# Patient Record
Sex: MALE
[De-identification: names, ages, dates, MRNs, and addresses within clinical notes are randomized per-mention and may not be internally consistent; named-entity substitution may affect disease eponyms.]

---

## 2018-07-23 PROBLEM — Z00.00 ENCOUNTER FOR PREVENTIVE HEALTH EXAMINATION: Status: ACTIVE | Noted: 2018-07-23

## 2018-08-02 ENCOUNTER — RECORD ABSTRACTING (OUTPATIENT)
Age: 48
End: 2018-08-02

## 2018-08-02 DIAGNOSIS — Z86.79 PERSONAL HISTORY OF OTHER DISEASES OF THE CIRCULATORY SYSTEM: ICD-10-CM

## 2018-08-02 DIAGNOSIS — Z80.3 FAMILY HISTORY OF MALIGNANT NEOPLASM OF BREAST: ICD-10-CM

## 2018-08-02 DIAGNOSIS — Z78.9 OTHER SPECIFIED HEALTH STATUS: ICD-10-CM

## 2018-08-02 DIAGNOSIS — Z80.1 FAMILY HISTORY OF MALIGNANT NEOPLASM OF TRACHEA, BRONCHUS AND LUNG: ICD-10-CM

## 2018-08-02 DIAGNOSIS — Z83.3 FAMILY HISTORY OF DIABETES MELLITUS: ICD-10-CM

## 2018-08-02 LAB
HBA1C MFR BLD: 13.3
HBA1C MFR BLD: 13.3
PODIATRY EVAL: NORMAL

## 2018-08-02 RX ORDER — OMEPRAZOLE 20 MG/1
20 TABLET, DELAYED RELEASE ORAL DAILY
Refills: 0 | Status: ACTIVE | COMMUNITY

## 2018-08-02 RX ORDER — PEN NEEDLE, DIABETIC 31 GX5/16"
NEEDLE, DISPOSABLE MISCELLANEOUS
Refills: 0 | Status: ACTIVE | COMMUNITY

## 2018-08-02 RX ORDER — ATORVASTATIN CALCIUM 10 MG/1
10 TABLET, FILM COATED ORAL DAILY
Refills: 0 | Status: ACTIVE | COMMUNITY

## 2018-08-22 ENCOUNTER — APPOINTMENT (OUTPATIENT)
Dept: ENDOCRINOLOGY | Facility: CLINIC | Age: 48
End: 2018-08-22

## 2018-08-27 ENCOUNTER — MEDICATION RENEWAL (OUTPATIENT)
Age: 48
End: 2018-08-27

## 2018-09-14 ENCOUNTER — APPOINTMENT (OUTPATIENT)
Dept: ENDOCRINOLOGY | Facility: CLINIC | Age: 48
End: 2018-09-14
Payer: COMMERCIAL

## 2018-09-14 VITALS
WEIGHT: 256 LBS | DIASTOLIC BLOOD PRESSURE: 80 MMHG | SYSTOLIC BLOOD PRESSURE: 132 MMHG | HEIGHT: 72 IN | OXYGEN SATURATION: 97 % | HEART RATE: 87 BPM | BODY MASS INDEX: 34.67 KG/M2

## 2018-09-14 LAB — GLUCOSE BLDC GLUCOMTR-MCNC: 104

## 2018-09-14 PROCEDURE — 82962 GLUCOSE BLOOD TEST: CPT

## 2018-09-14 PROCEDURE — 99214 OFFICE O/P EST MOD 30 MIN: CPT | Mod: 25

## 2018-09-21 ENCOUNTER — APPOINTMENT (OUTPATIENT)
Dept: ENDOCRINOLOGY | Facility: CLINIC | Age: 48
End: 2018-09-21

## 2018-10-23 ENCOUNTER — APPOINTMENT (OUTPATIENT)
Dept: ENDOCRINOLOGY | Facility: CLINIC | Age: 48
End: 2018-10-23

## 2018-11-07 ENCOUNTER — APPOINTMENT (OUTPATIENT)
Dept: ENDOCRINOLOGY | Facility: CLINIC | Age: 48
End: 2018-11-07

## 2018-11-16 ENCOUNTER — MEDICATION RENEWAL (OUTPATIENT)
Age: 48
End: 2018-11-16

## 2018-12-14 ENCOUNTER — MEDICATION RENEWAL (OUTPATIENT)
Age: 48
End: 2018-12-14

## 2018-12-20 ENCOUNTER — MEDICATION RENEWAL (OUTPATIENT)
Age: 48
End: 2018-12-20

## 2019-02-11 ENCOUNTER — RX RENEWAL (OUTPATIENT)
Age: 49
End: 2019-02-11

## 2019-02-12 ENCOUNTER — RX CHANGE (OUTPATIENT)
Age: 49
End: 2019-02-12

## 2019-02-12 RX ORDER — INSULIN LISPRO 100 [IU]/ML
100 INJECTION, SOLUTION INTRAVENOUS; SUBCUTANEOUS
Refills: 0 | Status: DISCONTINUED | COMMUNITY
End: 2019-02-12

## 2019-03-11 ENCOUNTER — RX RENEWAL (OUTPATIENT)
Age: 49
End: 2019-03-11

## 2019-03-13 ENCOUNTER — MEDICATION RENEWAL (OUTPATIENT)
Age: 49
End: 2019-03-13

## 2019-03-18 ENCOUNTER — MOBILE ON CALL (OUTPATIENT)
Age: 49
End: 2019-03-18

## 2019-03-19 ENCOUNTER — RX RENEWAL (OUTPATIENT)
Age: 49
End: 2019-03-19

## 2019-03-20 ENCOUNTER — RECORD ABSTRACTING (OUTPATIENT)
Age: 49
End: 2019-03-20

## 2019-03-20 ENCOUNTER — MEDICATION RENEWAL (OUTPATIENT)
Age: 49
End: 2019-03-20

## 2019-03-21 ENCOUNTER — APPOINTMENT (OUTPATIENT)
Dept: ENDOCRINOLOGY | Facility: CLINIC | Age: 49
End: 2019-03-21
Payer: MEDICAID

## 2019-03-21 ENCOUNTER — APPOINTMENT (OUTPATIENT)
Dept: ENDOCRINOLOGY | Facility: CLINIC | Age: 49
End: 2019-03-21

## 2019-03-21 VITALS
HEIGHT: 72 IN | OXYGEN SATURATION: 97 % | WEIGHT: 259 LBS | DIASTOLIC BLOOD PRESSURE: 70 MMHG | SYSTOLIC BLOOD PRESSURE: 112 MMHG | HEART RATE: 97 BPM | BODY MASS INDEX: 35.08 KG/M2

## 2019-03-21 LAB — GLUCOSE BLDC GLUCOMTR-MCNC: 128

## 2019-03-21 PROCEDURE — 99214 OFFICE O/P EST MOD 30 MIN: CPT | Mod: 25

## 2019-03-21 PROCEDURE — 82962 GLUCOSE BLOOD TEST: CPT

## 2019-03-21 RX ORDER — METFORMIN HYDROCHLORIDE 500 MG/1
500 TABLET, FILM COATED, EXTENDED RELEASE ORAL
Refills: 0 | Status: DISCONTINUED | COMMUNITY
End: 2019-03-21

## 2019-03-21 RX ORDER — INSULIN LISPRO 100 U/ML
100 INJECTION, SOLUTION INTRAVENOUS; SUBCUTANEOUS
Qty: 2 | Refills: 1 | Status: DISCONTINUED | COMMUNITY
Start: 2019-02-12 | End: 2019-03-21

## 2019-03-21 RX ADMIN — Medication 0: at 00:00

## 2019-03-21 NOTE — ASSESSMENT
[FreeTextEntry1] : 49 y/o obese male with Type 2 DM, Hyperlipidemia, HTN, and Vitamin D Deficiency. No recent labs. \par \par Plan: \par Type 2 DM: zac professional placed\par - labs now\par - restart Basaglar 40 units in pm\par - continue current medication regimen:\par Metformin 2000 mg daily\par Novolog 10-20 units sliding scale with meals\par - continue self BS monitoring\par - bring meter and logs to next visit\par \par Obesity: educated on healthy food choices\par - encouraged to increase routine exercise\par - discussed Bariatric surgery and handout given -will review information \par \par Hyperlipidemia: labs now, continue Atorvastatin\par \par HTN: stable. continue current medication regimen\par \par Vitamin D Deficiency: labs now, continue vitamin D supplement 50,000 units weekly\par \par Follow up visit in 2 weeks.

## 2019-03-21 NOTE — HISTORY OF PRESENT ILLNESS
[FreeTextEntry1] : Quality: Type 2 DM\par Severity: moderate \par Duration: 20 years\par Onset: fatigue\par Modifying Factors: Better with insulin \par Associated Symptoms: \par \par Current Regimen:\par Basaglar 40 units in pm- has not taken medication for months, will start today\par Metformin 2000 mg daily\par Novolog 10-20 units before meals - adjusts dose based on carbs \par \par Self blood sugar monitoring: 3-4 times a day, no meter, no logs\par per pt. averaging 200 - 280 \par \par exercise: active at work\par \par Diet:\par B- coffee, donut, sausage egg and cheese \par L- hamburgers, pizza, chicken cutlets. fruits and vegetables \par D- chicken, vegetables, rice\par Snacks- none\par \par Date of last eye exam: 1-2 years ago\par Date of last foot exam: 2018\par Date of last flu vaccine: 2019\par Date of last Pneumovax: no

## 2019-03-21 NOTE — REVIEW OF SYSTEMS
[Headache] : headaches [Fatigue] : no fatigue [Decreased Appetite] : appetite not decreased [Recent Weight Gain (___ Lbs)] : no recent weight gain [Recent Weight Loss (___ Lbs)] : no recent weight loss [Visual Field Defect] : no visual field defect [Blurry Vision] : no blurred vision [Dysphagia] : no dysphagia [Dysphonia] : no dysphonia [Neck Pain] : no neck pain [Chest Pain] : no chest pain [Palpitations] : no palpitations [SOB on Exertion] : no shortness of breath during exertion [Constipation] : no constipation [Diarrhea] : no diarrhea [Polyuria] : no polyuria [Dysuria] : no dysuria [Tremors] : no tremors [Depression] : no depression [Anxiety] : no anxiety [Polydipsia] : no polydipsia [Cold Intolerance] : cold tolerant [Heat Intolerance] : heat tolerant [Easy Bruising] : no tendency for easy bruising [Swelling] : no swelling [FreeTextEntry2] : weight stable  [de-identified] : often

## 2019-03-21 NOTE — REASON FOR VISIT
[Follow-Up: _____] : a [unfilled] follow-up visit [FreeTextEntry1] : Type 2 DM, Hyperlipidemia, HTN, and Vitamin D Deficiency

## 2019-03-21 NOTE — PHYSICAL EXAM
[Alert] : alert [No Acute Distress] : no acute distress [Well Nourished] : well nourished [Well Developed] : well developed [Normal Sclera/Conjunctiva] : normal sclera/conjunctiva [EOMI] : extra ocular movement intact [Normal Hearing] : hearing was normal [Supple] : the neck was supple [No LAD] : no lymphadenopathy [Thyroid Not Enlarged] : the thyroid was not enlarged [No Thyroid Nodules] : there were no palpable thyroid nodules [Normal Rate and Effort] : normal respiratory rhythm and effort [No Accessory Muscle Use] : no accessory muscle use [Clear to Auscultation] : lungs were clear to auscultation bilaterally [Normal Rate] : heart rate was normal  [Normal S1, S2] : normal S1 and S2 [Regular Rhythm] : with a regular rhythm [Normal Bowel Sounds] : normal bowel sounds [Not Tender] : non-tender [Soft] : abdomen soft [Normal Gait] : normal gait [Acanthosis Nigricans] : no acanthosis nigricans [Right Foot Was Examined] : right foot ~C was examined [Left Foot Was Examined] : left foot ~C was examined [Normal] : normal [Full ROM] : with full range of motion [Diminished Throughout Both Feet] : normal tactile sensation with monofilament testing throughout both feet [No Motor Deficits] : the motor exam was normal [No Tremors] : no tremors [Oriented x3] : oriented to person, place, and time [Normal Insight/Judgement] : insight and judgment were intact [Normal Mood] : the mood was normal

## 2019-04-03 ENCOUNTER — MEDICATION RENEWAL (OUTPATIENT)
Age: 49
End: 2019-04-03

## 2019-04-03 RX ORDER — SEMAGLUTIDE 1.34 MG/ML
2 INJECTION, SOLUTION SUBCUTANEOUS
Qty: 3 | Refills: 0 | Status: DISCONTINUED | COMMUNITY
Start: 2018-09-14 | End: 2019-04-03

## 2019-04-04 ENCOUNTER — APPOINTMENT (OUTPATIENT)
Dept: ENDOCRINOLOGY | Facility: CLINIC | Age: 49
End: 2019-04-04
Payer: MEDICAID

## 2019-04-04 ENCOUNTER — APPOINTMENT (OUTPATIENT)
Dept: ENDOCRINOLOGY | Facility: CLINIC | Age: 49
End: 2019-04-04

## 2019-04-04 VITALS
DIASTOLIC BLOOD PRESSURE: 70 MMHG | SYSTOLIC BLOOD PRESSURE: 110 MMHG | HEART RATE: 93 BPM | WEIGHT: 257 LBS | BODY MASS INDEX: 34.81 KG/M2 | HEIGHT: 72 IN | OXYGEN SATURATION: 97 %

## 2019-04-04 LAB — GLUCOSE BLDC GLUCOMTR-MCNC: 112

## 2019-04-04 PROCEDURE — 99214 OFFICE O/P EST MOD 30 MIN: CPT | Mod: 25

## 2019-04-04 PROCEDURE — 95251 CONT GLUC MNTR ANALYSIS I&R: CPT

## 2019-04-04 NOTE — HISTORY OF PRESENT ILLNESS
[FreeTextEntry1] : Patient presents today for routine follow-up of type 2 diabetes with associated neuropathy, hypertension, and hyperlipidemia. \par \par Current meds for glycemic control:\par Metformin 500 mg, 4 tabs daily\par Basaglar 40 units daily- has not been taking consistently, often falls asleep before he remembers to take it.\par Novolog 25 units with meals if eating bread, 10-15 units if eating salad or healthier meals\par Ozempic 0.25 mg weekly on Monday. Did not take this past Monday but had not missed any doses otherwise.\par SMBG 4x daily. Reviewed logs. BG has been primarily 170s to 210s, with a low of 126 and high of 320. \par Reviewed CGMS. Avg  +/- 54.1 with 63% of values in range, 36% high, and 1% low. He has a short post prandial spike in BG after breakfast, followed by a drop in BG In the early afternoon.\par Current B, ate eggs and coffee with creamer 8 hours ago.

## 2019-04-04 NOTE — PHYSICAL EXAM
[Alert] : alert [No Acute Distress] : no acute distress [Well Nourished] : well nourished [Well Developed] : well developed [Normal Sclera/Conjunctiva] : normal sclera/conjunctiva [EOMI] : extra ocular movement intact [No Proptosis] : no proptosis [Normal Oropharynx] : the oropharynx was normal [Thyroid Not Enlarged] : the thyroid was not enlarged [No Thyroid Nodules] : there were no palpable thyroid nodules [No Respiratory Distress] : no respiratory distress [No Accessory Muscle Use] : no accessory muscle use [Clear to Auscultation] : lungs were clear to auscultation bilaterally [Normal Rate] : heart rate was normal  [Normal S1, S2] : normal S1 and S2 [Regular Rhythm] : with a regular rhythm [No Edema] : there was no peripheral edema [Normal Gait] : normal gait [Oriented x3] : oriented to person, place, and time [Acanthosis Nigricans] : no acanthosis nigricans

## 2019-04-04 NOTE — ASSESSMENT
[FreeTextEntry1] : 48 year old male with T2DM and associated neuropathy, also with hypertension. Glycemic control is improving based on CGMS data.\par \par 1. T2DM\par -Increase Ozempic to 0.5 mg weekly. \par -Decrease Novolog to 20 units at breakfast. Continue 25 units before dinner. Usually only has two meals daily.\par -Continue to hold basal insulin- would likely benefit from basal insulin in the future, but will not add at this time given hypoglycemia on CGMS.\par -After he finishes current supply of Ozempic, will switch to Trulicity 1.5 mg weekly for insurance purposes.\par -Continue SMBG 4x daily and record readings with diet and insulin doses on log sheets.\par -Watch diet! Be careful of carbohydrate intake.\par -RTO to review data and for insulin adjustment in one month.\par \par 2. Hypertension- controlled.\par -Continue ARB.

## 2019-04-08 ENCOUNTER — APPOINTMENT (OUTPATIENT)
Age: 49
End: 2019-04-08
Payer: MEDICAID

## 2019-04-08 VITALS
DIASTOLIC BLOOD PRESSURE: 90 MMHG | OXYGEN SATURATION: 97 % | HEART RATE: 94 BPM | WEIGHT: 260 LBS | SYSTOLIC BLOOD PRESSURE: 145 MMHG | BODY MASS INDEX: 35.21 KG/M2 | HEIGHT: 72 IN | TEMPERATURE: 98.4 F

## 2019-04-08 DIAGNOSIS — F17.200 NICOTINE DEPENDENCE, UNSPECIFIED, UNCOMPLICATED: ICD-10-CM

## 2019-04-08 PROCEDURE — 99204 OFFICE O/P NEW MOD 45 MIN: CPT

## 2019-04-08 NOTE — REVIEW OF SYSTEMS
[Heartburn] : heartburn [Poor quality erections] : Poor quality erections [No erections] : no erections [Seen by urologist before (Name)  ___] : Preciously seen by a urologist: [unfilled] [Wake up at night to urinate  How many times?  ___] : wakes up to urinate [unfilled] times during the night [Negative] : Heme/Lymph

## 2019-04-13 LAB
ANION GAP SERPL CALC-SCNC: 16 MMOL/L
APPEARANCE: CLEAR
BACTERIA: NEGATIVE
BASOPHILS # BLD AUTO: 0.04 K/UL
BASOPHILS NFR BLD AUTO: 0.4 %
BILIRUBIN URINE: NEGATIVE
BLOOD URINE: NEGATIVE
BUN SERPL-MCNC: 24 MG/DL
CALCIUM SERPL-MCNC: 10.3 MG/DL
CHLORIDE SERPL-SCNC: 102 MMOL/L
CO2 SERPL-SCNC: 22 MMOL/L
COLOR: YELLOW
CREAT SERPL-MCNC: 1.32 MG/DL
EOSINOPHIL # BLD AUTO: 0.13 K/UL
EOSINOPHIL NFR BLD AUTO: 1.1 %
ESTRADIOL SERPL-MCNC: 18 PG/ML
FSH SERPL-MCNC: 10.5 IU/L
GLUCOSE QUALITATIVE U: NORMAL
GLUCOSE SERPL-MCNC: 181 MG/DL
HCT VFR BLD CALC: 41.5 %
HGB BLD-MCNC: 13.5 G/DL
HYALINE CASTS: 1 /LPF
IMM GRANULOCYTES NFR BLD AUTO: 0.7 %
KETONES URINE: NEGATIVE
LEUKOCYTE ESTERASE URINE: NEGATIVE
LYMPHOCYTES # BLD AUTO: 4.26 K/UL
LYMPHOCYTES NFR BLD AUTO: 37.6 %
MAN DIFF?: NORMAL
MCHC RBC-ENTMCNC: 29 PG
MCHC RBC-ENTMCNC: 32.5 GM/DL
MCV RBC AUTO: 89.1 FL
MICROSCOPIC-UA: NORMAL
MONOCYTES # BLD AUTO: 0.7 K/UL
MONOCYTES NFR BLD AUTO: 6.2 %
NEUTROPHILS # BLD AUTO: 6.11 K/UL
NEUTROPHILS NFR BLD AUTO: 54 %
NITRITE URINE: NEGATIVE
PH URINE: 6
PLATELET # BLD AUTO: 370 K/UL
POTASSIUM SERPL-SCNC: 4.7 MMOL/L
PROTEIN URINE: NORMAL
PSA SERPL-MCNC: 0.16 NG/ML
RBC # BLD: 4.66 M/UL
RBC # FLD: 12.5 %
RED BLOOD CELLS URINE: 1 /HPF
SODIUM SERPL-SCNC: 140 MMOL/L
SPECIFIC GRAVITY URINE: 1.03
SQUAMOUS EPITHELIAL CELLS: 0 /HPF
TESTOST BND SERPL-MCNC: 7.2 PG/ML
TESTOST SERPL-MCNC: 215.5 NG/DL
UROBILINOGEN URINE: NORMAL
WBC # FLD AUTO: 11.32 K/UL
WHITE BLOOD CELLS URINE: 1 /HPF

## 2019-04-13 NOTE — PHYSICAL EXAM
[General Appearance - Well Developed] : well developed [General Appearance - Well Nourished] : well nourished [Normal Appearance] : normal appearance [Well Groomed] : well groomed [General Appearance - In No Acute Distress] : no acute distress [Edema] : no peripheral edema [] : no respiratory distress [Respiration, Rhythm And Depth] : normal respiratory rhythm and effort [Exaggerated Use Of Accessory Muscles For Inspiration] : no accessory muscle use [Abdomen Soft] : soft [Abdomen Tenderness] : non-tender [Costovertebral Angle Tenderness] : no ~M costovertebral angle tenderness [Urethral Meatus] : meatus normal [Penis Abnormality] : normal circumcised penis [Scrotum] : the scrotum was normal [Testes Tenderness] : no tenderness of the testes [Prostate Enlargement] : the prostate was not enlarged [Prostate Tenderness] : the prostate was not tender [No Prostate Nodules] : no prostate nodules [Normal Station and Gait] : the gait and station were normal for the patient's age [No Focal Deficits] : no focal deficits [Sensation] : the sensory exam was normal to light touch and pinprick [Motor Exam] : the motor exam was normal [Oriented To Time, Place, And Person] : oriented to person, place, and time [Affect] : the affect was normal [Not Anxious] : not anxious [No Palpable Adenopathy] : no palpable adenopathy [FreeTextEntry1] : 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

## 2019-04-13 NOTE — ASSESSMENT
[FreeTextEntry1] : Erectile dysfunction:  Will order lab work CBC, BMP, testosterone, PSA, estradiol,FSH and return for duplex US of the penis for evaluation of Erectile Dysfunction

## 2019-04-13 NOTE — HISTORY OF PRESENT ILLNESS
[FreeTextEntry1] : Mr Lee is a 47 yo Male that presents for evaluation of erectile dysfunction.  He is a known diabetic with poor control.  He has prior been seen by another urologist for treatment of Erectile issues but they no longer take his insurance.  He stated he has tried Viagra and Cialis and their generics as well with no result and also tried some injection therapy that he did not know the name of the medication that was also limited in its effectiveness. He states he is unable to obtain an erection but often does ejaculate through a flaccid penis when stimulated.  He denies any dysuria, fever, chills, and flank pain.  he has no other urinary issues.

## 2019-04-15 ENCOUNTER — APPOINTMENT (OUTPATIENT)
Dept: UROLOGY | Facility: CLINIC | Age: 49
End: 2019-04-15
Payer: MEDICAID

## 2019-04-15 VITALS
HEIGHT: 72 IN | OXYGEN SATURATION: 96 % | BODY MASS INDEX: 35.21 KG/M2 | HEART RATE: 102 BPM | DIASTOLIC BLOOD PRESSURE: 53 MMHG | SYSTOLIC BLOOD PRESSURE: 110 MMHG | WEIGHT: 260 LBS

## 2019-04-15 PROCEDURE — 93980 PENILE VASCULAR STUDY: CPT

## 2019-04-15 PROCEDURE — 96374 THER/PROPH/DIAG INJ IV PUSH: CPT | Mod: 59

## 2019-04-15 PROCEDURE — 54235 NJX CORPORA CAVERNOSA RX AGT: CPT

## 2019-04-27 LAB — TESTOST SERPL-MCNC: 941 NG/DL

## 2019-05-07 ENCOUNTER — RX RENEWAL (OUTPATIENT)
Age: 49
End: 2019-05-07

## 2019-05-13 ENCOUNTER — APPOINTMENT (OUTPATIENT)
Dept: UROLOGY | Facility: CLINIC | Age: 49
End: 2019-05-13
Payer: MEDICAID

## 2019-05-13 PROCEDURE — 96372 THER/PROPH/DIAG INJ SC/IM: CPT

## 2019-05-13 RX ADMIN — TESTOSTERONE CYPIONATE 0 MG/ML: 200 INJECTION INTRAMUSCULAR at 00:00

## 2019-05-20 ENCOUNTER — APPOINTMENT (OUTPATIENT)
Dept: UROLOGY | Facility: CLINIC | Age: 49
End: 2019-05-20

## 2019-05-31 ENCOUNTER — APPOINTMENT (OUTPATIENT)
Dept: ENDOCRINOLOGY | Facility: CLINIC | Age: 49
End: 2019-05-31
Payer: MEDICAID

## 2019-05-31 VITALS
HEART RATE: 98 BPM | BODY MASS INDEX: 35.35 KG/M2 | WEIGHT: 261 LBS | SYSTOLIC BLOOD PRESSURE: 146 MMHG | DIASTOLIC BLOOD PRESSURE: 88 MMHG | HEIGHT: 72 IN

## 2019-05-31 LAB — GLUCOSE BLDC GLUCOMTR-MCNC: 222

## 2019-05-31 PROCEDURE — 99214 OFFICE O/P EST MOD 30 MIN: CPT | Mod: 25

## 2019-05-31 PROCEDURE — 82962 GLUCOSE BLOOD TEST: CPT

## 2019-05-31 RX ORDER — DULAGLUTIDE 0.75 MG/.5ML
0.75 INJECTION, SOLUTION SUBCUTANEOUS
Qty: 3 | Refills: 1 | Status: DISCONTINUED | COMMUNITY
Start: 2019-04-03 | End: 2019-05-31

## 2019-05-31 RX ORDER — INSULIN ASPART 100 [IU]/ML
100 INJECTION, SOLUTION INTRAVENOUS; SUBCUTANEOUS
Refills: 0 | Status: DISCONTINUED | COMMUNITY
End: 2019-05-31

## 2019-05-31 RX ORDER — EXENATIDE 2 MG/.85ML
2 INJECTION, SUSPENSION, EXTENDED RELEASE SUBCUTANEOUS
Qty: 3 | Refills: 1 | Status: DISCONTINUED | COMMUNITY
Start: 2019-03-19 | End: 2019-05-31

## 2019-05-31 NOTE — HISTORY OF PRESENT ILLNESS
[FreeTextEntry1] : Patient presents today for routine follow-up of type 2 diabetes with associated neuropathy, hypertension, and hyperlipidemia. \par \par Current meds for glycemic control:\par Metformin 500 mg, 4 tabs daily\par Admelog 25 units with meals\par Trulicity 0.75 mg weekly\par SMBG 3-4x daily. BG is high-100s to high-200s. No hypoglycemia. BG rises as the day goes on.\par Current B, ate a hamburger about 2 hours ago.\par Now working nights. Does not usually eat while he is at work.\par \par Eye exam: 2019, no DR per patient. No vision changes. \par Foot exam: one year ago. Denies pain/numbness/tingling in feet.\par Diet: eats a lot of heroes, chicken, eggs, hamburgers\par Exercise: physical job (construction), yard work\par Weight: stable

## 2019-05-31 NOTE — ASSESSMENT
[FreeTextEntry1] : 48 year old male with T2DM and associated neuropathy, also with hypertension, hyperlipidemia, and low testosterone. Glycemic control has worsened.\par \par 1. T2DM\par -Increase Trulicity to 1.5 mg weekly.\par -Increase Admelog to 30 units before meals.\par -Will continue to hold basal insulin due to erratic eating schedule, physical job, and concerns for hypoglycemia.\par -Continue SMBG 4x daily and record readings with diet and insulin doses on log sheets. Send logs in one week.\par -Watch diet! Be careful of carbohydrate intake.\par -Repeat A1c now.\par \par 2. Hypertension- controlled.\par -Continue ARB.\par \par 3. Hyperlipidemia\par -Continue statin.\par -Repeat lipids now.\par \par 4. Low testosterone\par -Managed by urology, receiving IM testosterone injections.

## 2019-05-31 NOTE — REVIEW OF SYSTEMS
[Polyuria] : polyuria [Recent Weight Gain (___ Lbs)] : no recent weight gain [Recent Weight Loss (___ Lbs)] : no recent weight loss [Blurry Vision] : no blurred vision [Chest Pain] : no chest pain [Palpitations] : no palpitations [Shortness Of Breath] : no shortness of breath [Nausea] : no nausea [Vomiting] : no vomiting was observed [Abdominal Pain] : no abdominal pain [Pain/Numbness of Digits] : no pain/numbness of digits [Polydipsia] : no polydipsia [FreeTextEntry3] : no changes in vision

## 2019-06-03 ENCOUNTER — APPOINTMENT (OUTPATIENT)
Dept: UROLOGY | Facility: CLINIC | Age: 49
End: 2019-06-03

## 2019-06-06 ENCOUNTER — RX RENEWAL (OUTPATIENT)
Age: 49
End: 2019-06-06

## 2019-06-13 ENCOUNTER — APPOINTMENT (OUTPATIENT)
Dept: UROLOGY | Facility: CLINIC | Age: 49
End: 2019-06-13
Payer: MEDICAID

## 2019-06-13 PROCEDURE — 96372 THER/PROPH/DIAG INJ SC/IM: CPT

## 2019-06-13 RX ADMIN — TESTOSTERONE CYPIONATE 0 MG/ML: 200 INJECTION INTRAMUSCULAR at 00:00

## 2019-06-18 ENCOUNTER — RESULT REVIEW (OUTPATIENT)
Age: 49
End: 2019-06-18

## 2019-06-21 ENCOUNTER — MEDICATION RENEWAL (OUTPATIENT)
Age: 49
End: 2019-06-21

## 2019-06-21 ENCOUNTER — RX RENEWAL (OUTPATIENT)
Age: 49
End: 2019-06-21

## 2019-08-14 ENCOUNTER — MEDICATION RENEWAL (OUTPATIENT)
Age: 49
End: 2019-08-14

## 2019-08-27 ENCOUNTER — RX RENEWAL (OUTPATIENT)
Age: 49
End: 2019-08-27

## 2019-09-06 ENCOUNTER — RX RENEWAL (OUTPATIENT)
Age: 49
End: 2019-09-06

## 2019-09-06 ENCOUNTER — APPOINTMENT (OUTPATIENT)
Dept: ENDOCRINOLOGY | Facility: CLINIC | Age: 49
End: 2019-09-06
Payer: MEDICAID

## 2019-09-06 VITALS
WEIGHT: 258 LBS | DIASTOLIC BLOOD PRESSURE: 80 MMHG | HEIGHT: 72 IN | BODY MASS INDEX: 34.95 KG/M2 | HEART RATE: 88 BPM | SYSTOLIC BLOOD PRESSURE: 110 MMHG

## 2019-09-06 DIAGNOSIS — I10 ESSENTIAL (PRIMARY) HYPERTENSION: ICD-10-CM

## 2019-09-06 DIAGNOSIS — E78.2 MIXED HYPERLIPIDEMIA: ICD-10-CM

## 2019-09-06 DIAGNOSIS — R79.89 OTHER SPECIFIED ABNORMAL FINDINGS OF BLOOD CHEMISTRY: ICD-10-CM

## 2019-09-06 DIAGNOSIS — E66.9 OBESITY, UNSPECIFIED: ICD-10-CM

## 2019-09-06 DIAGNOSIS — E11.9 TYPE 2 DIABETES MELLITUS W/OUT COMPLICATIONS: ICD-10-CM

## 2019-09-06 DIAGNOSIS — E55.9 VITAMIN D DEFICIENCY, UNSPECIFIED: ICD-10-CM

## 2019-09-06 DIAGNOSIS — E11.65 TYPE 2 DIABETES MELLITUS WITH HYPERGLYCEMIA: ICD-10-CM

## 2019-09-06 DIAGNOSIS — E11.40 TYPE 2 DIABETES MELLITUS WITH DIABETIC NEUROPATHY, UNSPECIFIED: ICD-10-CM

## 2019-09-06 LAB
ESTIMATED AVERAGE GLUCOSE: 8.2
GLUCOSE BLDC GLUCOMTR-MCNC: 154
GLUCOSE SERPL-MCNC: 170
LDLC SERPL DIRECT ASSAY-MCNC: 71

## 2019-09-06 PROCEDURE — 99214 OFFICE O/P EST MOD 30 MIN: CPT | Mod: 25

## 2019-09-06 PROCEDURE — 82962 GLUCOSE BLOOD TEST: CPT

## 2019-09-06 RX ORDER — INSULIN GLARGINE 100 [IU]/ML
100 INJECTION, SOLUTION SUBCUTANEOUS
Refills: 0 | Status: DISCONTINUED | COMMUNITY
End: 2019-09-06

## 2019-09-06 RX ORDER — INSULIN GLARGINE 100 [IU]/ML
100 INJECTION, SOLUTION SUBCUTANEOUS
Qty: 3 | Refills: 0 | Status: DISCONTINUED | COMMUNITY
Start: 2019-03-11 | End: 2019-09-06

## 2019-09-06 RX ORDER — INSULIN LISPRO 100 U/ML
100 INJECTION, SOLUTION SUBCUTANEOUS
Qty: 6 | Refills: 0 | Status: DISCONTINUED | COMMUNITY
Start: 2019-05-31 | End: 2019-09-06

## 2019-09-06 NOTE — PHYSICAL EXAM
[No Acute Distress] : no acute distress [Well Nourished] : well nourished [Alert] : alert [Well Developed] : well developed [Normal Sclera/Conjunctiva] : normal sclera/conjunctiva [EOMI] : extra ocular movement intact [Thyroid Not Enlarged] : the thyroid was not enlarged [Normal Oropharynx] : the oropharynx was normal [No Proptosis] : no proptosis [No Respiratory Distress] : no respiratory distress [No Thyroid Nodules] : there were no palpable thyroid nodules [Clear to Auscultation] : lungs were clear to auscultation bilaterally [No Accessory Muscle Use] : no accessory muscle use [Normal Rate] : heart rate was normal  [Regular Rhythm] : with a regular rhythm [Normal S1, S2] : normal S1 and S2 [Normal Gait] : normal gait [No Edema] : there was no peripheral edema [Oriented x3] : oriented to person, place, and time [Normal Affect] : the affect was normal [Normal Mood] : the mood was normal [Acanthosis Nigricans] : no acanthosis nigricans

## 2019-09-06 NOTE — REVIEW OF SYSTEMS
[Polyuria] : polyuria [Recent Weight Loss (___ Lbs)] : recent [unfilled] ~Ulb weight loss [Recent Weight Gain (___ Lbs)] : no recent weight gain [Blurry Vision] : no blurred vision [Chest Pain] : no chest pain [Palpitations] : no palpitations [Nausea] : no nausea [Shortness Of Breath] : no shortness of breath [Vomiting] : no vomiting was observed [Abdominal Pain] : no abdominal pain [Pain/Numbness of Digits] : no pain/numbness of digits [Polydipsia] : no polydipsia [FreeTextEntry3] : no changes in vision

## 2019-09-06 NOTE — HISTORY OF PRESENT ILLNESS
[FreeTextEntry1] : Patient presents today for routine follow-up of type 2 diabetes with associated neuropathy, hypertension, and hyperlipidemia. \par \par Current meds for glycemic control:\par Metformin 500 mg, 4 tabs daily\par Admelog 35 units with meals\par Trulicity 1.5 mg weekly\par SMBG 1-2x daily before eating. No meter or log today. Reports BG has been 140s to 200s. Denies hypoglycemia.\par Current B, had coffee and a roll for breakfast this morning about 8 hours ago\par Erratic work schedule, works both night shift and day shift, often does doubles.\par \par Eye exam: 2019, no DR per patient. No vision changes. \par Foot exam: one year ago. Denies pain/numbness/tingling in feet.\par Diet: eats two meals daily.\par B- coffee and roll, muffin, or donut\par L- skips\par D- chicken with potatoes and green beans, perogies, hamburgers/hot dogs without the bun. whatever wife cooks\par Exercise: physical job (construction)\par Weight: 3 pounds in 3 months\par \par Was receiving IM testosterone from urology for ED, but stopped going in 2019. Considering a new provider.

## 2019-09-12 ENCOUNTER — RESULT REVIEW (OUTPATIENT)
Age: 49
End: 2019-09-12

## 2019-09-23 ENCOUNTER — APPOINTMENT (OUTPATIENT)
Dept: UROLOGY | Facility: CLINIC | Age: 49
End: 2019-09-23
Payer: MEDICAID

## 2019-09-23 PROCEDURE — 96372 THER/PROPH/DIAG INJ SC/IM: CPT

## 2019-09-23 RX ADMIN — TESTOSTERONE CYPIONATE 0 MG/ML: 200 INJECTION INTRAMUSCULAR at 00:00

## 2019-10-04 ENCOUNTER — MEDICATION RENEWAL (OUTPATIENT)
Age: 49
End: 2019-10-04

## 2019-10-04 RX ORDER — LOSARTAN POTASSIUM AND HYDROCHLOROTHIAZIDE 25; 100 MG/1; MG/1
100-25 TABLET ORAL DAILY
Qty: 90 | Refills: 1 | Status: DISCONTINUED | COMMUNITY
Start: 1900-01-01 | End: 2019-10-04

## 2019-10-28 ENCOUNTER — APPOINTMENT (OUTPATIENT)
Dept: UROLOGY | Facility: CLINIC | Age: 49
End: 2019-10-28

## 2019-12-05 ENCOUNTER — APPOINTMENT (OUTPATIENT)
Dept: ENDOCRINOLOGY | Facility: CLINIC | Age: 49
End: 2019-12-05

## 2019-12-08 ENCOUNTER — RX RENEWAL (OUTPATIENT)
Age: 49
End: 2019-12-08

## 2019-12-12 ENCOUNTER — MEDICATION RENEWAL (OUTPATIENT)
Age: 49
End: 2019-12-12

## 2019-12-18 ENCOUNTER — MEDICATION RENEWAL (OUTPATIENT)
Age: 49
End: 2019-12-18

## 2019-12-18 RX ORDER — LIRAGLUTIDE 6 MG/ML
18 INJECTION SUBCUTANEOUS DAILY
Qty: 12 | Refills: 1 | Status: DISCONTINUED | COMMUNITY
Start: 2019-10-04 | End: 2019-12-18

## 2019-12-23 ENCOUNTER — RX RENEWAL (OUTPATIENT)
Age: 49
End: 2019-12-23

## 2020-01-27 ENCOUNTER — RX RENEWAL (OUTPATIENT)
Age: 50
End: 2020-01-27

## 2020-01-27 ENCOUNTER — APPOINTMENT (OUTPATIENT)
Dept: UROLOGY | Facility: CLINIC | Age: 50
End: 2020-01-27
Payer: MEDICAID

## 2020-01-27 VITALS
DIASTOLIC BLOOD PRESSURE: 84 MMHG | WEIGHT: 260.38 LBS | OXYGEN SATURATION: 96 % | TEMPERATURE: 98.1 F | HEIGHT: 72 IN | SYSTOLIC BLOOD PRESSURE: 137 MMHG | RESPIRATION RATE: 18 BRPM | BODY MASS INDEX: 35.27 KG/M2 | HEART RATE: 97 BPM

## 2020-01-27 DIAGNOSIS — N40.0 BENIGN PROSTATIC HYPERPLASIA WITHOUT LOWER URINARY TRACT SYMPMS: ICD-10-CM

## 2020-01-27 PROCEDURE — 99213 OFFICE O/P EST LOW 20 MIN: CPT

## 2020-02-01 NOTE — HISTORY OF PRESENT ILLNESS
[FreeTextEntry1] : This patient presents for a checkup. He has been treated for rectal dysfunction and lower tract obstructions in the past feels that he is doing reasonably well at this time.

## 2020-02-01 NOTE — PHYSICAL EXAM
[Normal Appearance] : normal appearance [General Appearance - Well Developed] : well developed [General Appearance - Well Nourished] : well nourished [Abdomen Soft] : soft [Well Groomed] : well groomed [General Appearance - In No Acute Distress] : no acute distress [Costovertebral Angle Tenderness] : no ~M costovertebral angle tenderness [Abdomen Tenderness] : non-tender [Urethral Meatus] : meatus normal [Penis Abnormality] : normal circumcised penis [Testes Tenderness] : no tenderness of the testes [Prostate Enlargement] : the prostate was not enlarged [Scrotum] : the scrotum was normal [Prostate Tenderness] : the prostate was not tender [FreeTextEntry1] : 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  [No Prostate Nodules] : no prostate nodules [Edema] : no peripheral edema [Respiration, Rhythm And Depth] : normal respiratory rhythm and effort [Exaggerated Use Of Accessory Muscles For Inspiration] : no accessory muscle use [] : no respiratory distress [Oriented To Time, Place, And Person] : oriented to person, place, and time [Not Anxious] : not anxious [Affect] : the affect was normal [No Focal Deficits] : no focal deficits [Normal Station and Gait] : the gait and station were normal for the patient's age [Sensation] : the sensory exam was normal to light touch and pinprick [No Palpable Adenopathy] : no palpable adenopathy [Motor Exam] : the motor exam was normal

## 2020-03-02 ENCOUNTER — APPOINTMENT (OUTPATIENT)
Dept: UROLOGY | Facility: CLINIC | Age: 50
End: 2020-03-02
Payer: COMMERCIAL

## 2020-03-02 PROCEDURE — 96372 THER/PROPH/DIAG INJ SC/IM: CPT

## 2020-03-02 RX ADMIN — TESTOSTERONE ENANTHATE 0 MG/ML: 200 INJECTION, SOLUTION INTRAMUSCULAR at 00:00

## 2020-03-06 ENCOUNTER — APPOINTMENT (OUTPATIENT)
Dept: ENDOCRINOLOGY | Facility: CLINIC | Age: 50
End: 2020-03-06

## 2020-03-09 ENCOUNTER — RX RENEWAL (OUTPATIENT)
Age: 50
End: 2020-03-09

## 2020-03-16 ENCOUNTER — RX RENEWAL (OUTPATIENT)
Age: 50
End: 2020-03-16

## 2020-03-31 RX ORDER — ERGOCALCIFEROL 1.25 MG/1
1.25 MG CAPSULE ORAL
Qty: 24 | Refills: 1 | Status: ACTIVE | COMMUNITY
Start: 1900-01-01 | End: 1900-01-01

## 2020-03-31 RX ORDER — BLOOD SUGAR DIAGNOSTIC
STRIP MISCELLANEOUS
Qty: 500 | Refills: 0 | Status: ACTIVE | COMMUNITY
Start: 1900-01-01 | End: 1900-01-01

## 2020-04-09 RX ORDER — INSULIN LISPRO 100 [IU]/ML
100 INJECTION, SOLUTION INTRAVENOUS; SUBCUTANEOUS
Qty: 90 | Refills: 0 | Status: ACTIVE | COMMUNITY
Start: 2019-06-06 | End: 1900-01-01

## 2020-04-28 ENCOUNTER — RX RENEWAL (OUTPATIENT)
Age: 50
End: 2020-04-28

## 2020-06-11 ENCOUNTER — APPOINTMENT (OUTPATIENT)
Dept: UROLOGY | Facility: CLINIC | Age: 50
End: 2020-06-11
Payer: COMMERCIAL

## 2020-06-11 VITALS
HEIGHT: 72 IN | TEMPERATURE: 97.6 F | BODY MASS INDEX: 35.35 KG/M2 | OXYGEN SATURATION: 97 % | DIASTOLIC BLOOD PRESSURE: 81 MMHG | SYSTOLIC BLOOD PRESSURE: 128 MMHG | HEART RATE: 102 BPM | WEIGHT: 261 LBS

## 2020-06-11 PROCEDURE — 54235 NJX CORPORA CAVERNOSA RX AGT: CPT

## 2020-06-24 ENCOUNTER — RX RENEWAL (OUTPATIENT)
Age: 50
End: 2020-06-24

## 2020-06-30 ENCOUNTER — RX RENEWAL (OUTPATIENT)
Age: 50
End: 2020-06-30

## 2020-06-30 RX ORDER — LOSARTAN POTASSIUM 100 MG/1
100 TABLET, FILM COATED ORAL DAILY
Qty: 30 | Refills: 2 | Status: ACTIVE | COMMUNITY
Start: 2019-10-04 | End: 1900-01-01

## 2020-07-07 RX ORDER — HYDROCHLOROTHIAZIDE 25 MG/1
25 TABLET ORAL
Qty: 90 | Refills: 0 | Status: ACTIVE | COMMUNITY
Start: 2019-10-04 | End: 1900-01-01

## 2020-07-20 ENCOUNTER — RX RENEWAL (OUTPATIENT)
Age: 50
End: 2020-07-20

## 2020-07-22 ENCOUNTER — RX RENEWAL (OUTPATIENT)
Age: 50
End: 2020-07-22

## 2020-08-04 RX ORDER — FENOFIBRATE 160 MG/1
160 TABLET ORAL DAILY
Qty: 90 | Refills: 0 | Status: ACTIVE | COMMUNITY
Start: 1900-01-01 | End: 1900-01-01

## 2020-08-04 RX ORDER — SYRINGE-NEEDLE,INSULIN,0.5 ML 31GX15/64"
31G X 15/64" SYRINGE, EMPTY DISPOSABLE MISCELLANEOUS
Qty: 3 | Refills: 1 | Status: ACTIVE | COMMUNITY
Start: 2020-01-27 | End: 1900-01-01

## 2020-08-21 ENCOUNTER — APPOINTMENT (OUTPATIENT)
Dept: ENDOCRINOLOGY | Facility: CLINIC | Age: 50
End: 2020-08-21

## 2020-08-28 ENCOUNTER — RX RENEWAL (OUTPATIENT)
Age: 50
End: 2020-08-28

## 2020-09-03 ENCOUNTER — RX RENEWAL (OUTPATIENT)
Age: 50
End: 2020-09-03

## 2020-09-11 RX ORDER — METFORMIN ER 500 MG 500 MG/1
500 TABLET ORAL
Qty: 360 | Refills: 0 | Status: ACTIVE | COMMUNITY
Start: 2019-02-11 | End: 1900-01-01

## 2020-09-18 ENCOUNTER — RX RENEWAL (OUTPATIENT)
Age: 50
End: 2020-09-18

## 2020-09-29 ENCOUNTER — RX RENEWAL (OUTPATIENT)
Age: 50
End: 2020-09-29

## 2020-09-29 RX ORDER — ERGOCALCIFEROL 1.25 MG/1
1.25 MG CAPSULE, LIQUID FILLED ORAL
Qty: 8 | Refills: 5 | Status: ACTIVE | COMMUNITY
Start: 2019-12-08 | End: 1900-01-01

## 2020-10-05 ENCOUNTER — APPOINTMENT (OUTPATIENT)
Dept: ENDOCRINOLOGY | Facility: CLINIC | Age: 50
End: 2020-10-05

## 2020-10-09 ENCOUNTER — APPOINTMENT (OUTPATIENT)
Dept: UROLOGY | Facility: CLINIC | Age: 50
End: 2020-10-09
Payer: COMMERCIAL

## 2020-10-09 PROCEDURE — 99213 OFFICE O/P EST LOW 20 MIN: CPT

## 2020-10-09 RX ORDER — ATORVASTATIN CALCIUM 10 MG/1
10 TABLET, FILM COATED ORAL
Qty: 30 | Refills: 0 | Status: ACTIVE | COMMUNITY
Start: 2018-12-14 | End: 1900-01-01

## 2020-10-09 NOTE — ASSESSMENT
[FreeTextEntry1] : 49 yo M with ED, was being treated with intracavernosal injections. No response to PDE5i in past. Given his fear of penile injections, discussed alternatives. Discussed IPP and semi-rigid penile implant. Discussed vacuum erection devices. Pt wishes to try higher dose of ICI for now. Will look into obtaining for [facility]. If not logistically possible, will refer pt back to Dr Garrett.

## 2020-10-09 NOTE — HISTORY OF PRESENT ILLNESS
[FreeTextEntry1] : 50 year old man seen 10/09/2020 with complaint of ED and low testosterone. He is known to Dr Garrett for these conditions. He had previously been on TRT with inejctions, though this was stopped. More recently, he was being given TriMix for ED. Dr Garrett was titrating dose with injections in the office. Pt received 0.3cc of 30/2/50 which gave E3-4 erections. Dr Garrett prescribed 0.3 mL syrignes of 30/3/100 for home use. Pt was too afraid to administer injections due to large size of needle compared to his insulin needles. He does not know how this higher dose would have worked as he did nto try it. He discarded the medication. He denies significant bothersome LUTS. He denies low libido or depressed mood, but does have some fatigue. He attributes this to poor sleep though. Does not think this improved with TRT. \par No hematuria, no dysuria, no frequency, no urgency, no hesitancy, no straining. No incontinence. \par No fevers, no chills, no nausea, no vomiting, no flank pain. \par \par No family history contributory to ED.

## 2020-10-09 NOTE — PHYSICAL EXAM
[General Appearance - Well Developed] : well developed [General Appearance - Well Nourished] : well nourished [Normal Appearance] : normal appearance [Well Groomed] : well groomed [General Appearance - In No Acute Distress] : no acute distress [Edema] : no peripheral edema [Respiration, Rhythm And Depth] : normal respiratory rhythm and effort [Exaggerated Use Of Accessory Muscles For Inspiration] : no accessory muscle use [Abdomen Soft] : soft [Abdomen Tenderness] : non-tender [Costovertebral Angle Tenderness] : no ~M costovertebral angle tenderness [FreeTextEntry1] : obese [Scrotum] : the scrotum was normal [Normal Station and Gait] : the gait and station were normal for the patient's age [] : no rash [No Focal Deficits] : no focal deficits [Oriented To Time, Place, And Person] : oriented to person, place, and time [Affect] : the affect was normal [Mood] : the mood was normal [Not Anxious] : not anxious [No Palpable Adenopathy] : no palpable adenopathy

## 2020-10-13 ENCOUNTER — RX RENEWAL (OUTPATIENT)
Age: 50
End: 2020-10-13

## 2020-10-14 RX ORDER — DULAGLUTIDE 1.5 MG/.5ML
1.5 INJECTION, SOLUTION SUBCUTANEOUS
Qty: 1 | Refills: 0 | Status: ACTIVE | COMMUNITY
Start: 2019-05-31 | End: 1900-01-01

## 2020-10-22 ENCOUNTER — NON-APPOINTMENT (OUTPATIENT)
Age: 50
End: 2020-10-22

## 2020-10-22 ENCOUNTER — RX RENEWAL (OUTPATIENT)
Age: 50
End: 2020-10-22

## 2020-10-28 ENCOUNTER — RX CHANGE (OUTPATIENT)
Age: 50
End: 2020-10-28

## 2020-10-28 ENCOUNTER — NON-APPOINTMENT (OUTPATIENT)
Age: 50
End: 2020-10-28

## 2020-11-03 ENCOUNTER — APPOINTMENT (OUTPATIENT)
Dept: ENDOCRINOLOGY | Facility: CLINIC | Age: 50
End: 2020-11-03

## 2020-11-30 ENCOUNTER — APPOINTMENT (OUTPATIENT)
Dept: UROLOGY | Facility: CLINIC | Age: 50
End: 2020-11-30

## 2020-12-01 ENCOUNTER — RX RENEWAL (OUTPATIENT)
Age: 50
End: 2020-12-01

## 2020-12-01 RX ORDER — LANCETS 30 GAUGE
EACH MISCELLANEOUS
Qty: 1 | Refills: 4 | Status: ACTIVE | COMMUNITY
Start: 2020-04-07 | End: 1900-01-01

## 2020-12-21 ENCOUNTER — APPOINTMENT (OUTPATIENT)
Dept: UROLOGY | Facility: CLINIC | Age: 50
End: 2020-12-21
Payer: COMMERCIAL

## 2020-12-21 VITALS
DIASTOLIC BLOOD PRESSURE: 88 MMHG | SYSTOLIC BLOOD PRESSURE: 145 MMHG | HEART RATE: 103 BPM | HEIGHT: 72 IN | WEIGHT: 278 LBS | BODY MASS INDEX: 37.65 KG/M2 | OXYGEN SATURATION: 97 %

## 2020-12-21 DIAGNOSIS — N52.9 MALE ERECTILE DYSFUNCTION, UNSPECIFIED: ICD-10-CM

## 2020-12-21 PROCEDURE — 99072 ADDL SUPL MATRL&STAF TM PHE: CPT

## 2020-12-21 PROCEDURE — 99213 OFFICE O/P EST LOW 20 MIN: CPT

## 2020-12-24 PROBLEM — N52.9 ERECTILE DYSFUNCTION: Status: ACTIVE | Noted: 2019-04-08

## 2020-12-24 NOTE — HISTORY OF PRESENT ILLNESS
[FreeTextEntry1] : This patient's would like to resume the idea of penile injection therapy.  He had been here approximately 6 months ago but would now like to return to this field of action

## 2020-12-24 NOTE — END OF VISIT
[FreeTextEntry3] : He was given a syringe of30/3/100 0.3cc and will get back to with results of this.  If this is satisfactory I will then prescribe it.

## 2020-12-24 NOTE — PHYSICAL EXAM
[General Appearance - Well Developed] : well developed [General Appearance - Well Nourished] : well nourished [Normal Appearance] : normal appearance [Well Groomed] : well groomed [General Appearance - In No Acute Distress] : no acute distress [Abdomen Soft] : soft [Abdomen Tenderness] : non-tender [Costovertebral Angle Tenderness] : no ~M costovertebral angle tenderness [Urethral Meatus] : meatus normal [Penis Abnormality] : normal circumcised penis [Scrotum] : the scrotum was normal [Testes Tenderness] : no tenderness of the testes [Prostate Enlargement] : the prostate was not enlarged [Prostate Tenderness] : the prostate was not tender [No Prostate Nodules] : no prostate nodules [FreeTextEntry1] : 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  [Edema] : no peripheral edema [] : no respiratory distress [Respiration, Rhythm And Depth] : normal respiratory rhythm and effort [Exaggerated Use Of Accessory Muscles For Inspiration] : no accessory muscle use [Oriented To Time, Place, And Person] : oriented to person, place, and time [Affect] : the affect was normal [Not Anxious] : not anxious [Normal Station and Gait] : the gait and station were normal for the patient's age [No Focal Deficits] : no focal deficits [Sensation] : the sensory exam was normal to light touch and pinprick [Motor Exam] : the motor exam was normal [No Palpable Adenopathy] : no palpable adenopathy

## 2023-09-16 ENCOUNTER — OFFICE (OUTPATIENT)
Dept: URBAN - METROPOLITAN AREA CLINIC 113 | Facility: CLINIC | Age: 53
Setting detail: OPHTHALMOLOGY
End: 2023-09-16
Payer: MEDICAID

## 2023-09-16 DIAGNOSIS — H25.13: ICD-10-CM

## 2023-09-16 DIAGNOSIS — E11.3293: ICD-10-CM

## 2023-09-16 DIAGNOSIS — H01.001: ICD-10-CM

## 2023-09-16 DIAGNOSIS — H40.013: ICD-10-CM

## 2023-09-16 PROCEDURE — 92014 COMPRE OPH EXAM EST PT 1/>: CPT | Performed by: OPHTHALMOLOGY

## 2023-09-16 PROCEDURE — 92083 EXTENDED VISUAL FIELD XM: CPT | Performed by: OPHTHALMOLOGY

## 2023-09-16 PROCEDURE — 92250 FUNDUS PHOTOGRAPHY W/I&R: CPT | Performed by: OPHTHALMOLOGY

## 2023-09-16 ASSESSMENT — REFRACTION_MANIFEST
OD_ADD: +1.75
OS_CYLINDER: -0.25
OD_CYLINDER: -0.50
OS_CYLINDER: -0.50
OS_AXIS: 180
OS_SPHERE: +2.00
OD_AXIS: 176
OD_SPHERE: +0.75
OS_SPHERE: +0.75
OD_SPHERE: +2.00
OS_VA1: 20/20-1
OS_ADD: +1.75
OD_AXIS: 177
OD_VA1: 20/20-2
OD_CYLINDER: -0.50
OS_AXIS: 057

## 2023-09-16 ASSESSMENT — REFRACTION_CURRENTRX
OS_VPRISM_DIRECTION: SV
OD_CYLINDER: -0.50
OS_CYLINDER: -0.50
OS_VPRISM_DIRECTION: SV
OD_SPHERE: +2.00
OD_CYLINDER: -0.50
OD_VPRISM_DIRECTION: SV
OD_SPHERE: +2.00
OS_OVR_VA: 20/
OD_AXIS: 171
OS_SPHERE: +2.00
OD_AXIS: 180
OD_OVR_VA: 20/
OS_OVR_VA: 20/
OS_AXIS: 002
OS_SPHERE: +2.00
OD_VPRISM_DIRECTION: SV
OD_OVR_VA: 20/
OS_AXIS: 003
OS_CYLINDER: -0.50

## 2023-09-16 ASSESSMENT — AXIALLENGTH_DERIVED
OS_AL: 24.1414
OD_AL: 24.1444
OD_AL: 24.0935
OS_AL: 23.691
OS_AL: 24.1414
OD_AL: 23.6448

## 2023-09-16 ASSESSMENT — SPHEQUIV_DERIVED
OS_SPHEQUIV: 0.625
OD_SPHEQUIV: 1.75
OS_SPHEQUIV: 1.75
OD_SPHEQUIV: 0.625
OS_SPHEQUIV: 0.625
OD_SPHEQUIV: 0.5

## 2023-09-16 ASSESSMENT — LID EXAM ASSESSMENTS
OS_BLEPHARITIS: +1
OD_BLEPHARITIS: +1

## 2023-09-16 ASSESSMENT — VISUAL ACUITY
OS_BCVA: 20/25+1
OD_BCVA: 20/20-1

## 2023-09-16 ASSESSMENT — REFRACTION_AUTOREFRACTION
OD_AXIS: 153
OS_AXIS: 022
OS_SPHERE: +0.75
OD_CYLINDER: -0.25
OD_SPHERE: +0.75
OS_CYLINDER: -0.25

## 2023-09-16 ASSESSMENT — KERATOMETRY
OD_K1POWER_DIOPTERS: 41.25
OD_AXISANGLE_DEGREES: 086
OS_K2POWER_DIOPTERS: 41.75
OS_AXISANGLE_DEGREES: 098
METHOD_AUTO_MANUAL: AUTO
OD_K2POWER_DIOPTERS: 41.75
OS_K1POWER_DIOPTERS: 41.00

## 2023-09-16 ASSESSMENT — TONOMETRY
OS_IOP_MMHG: 13
OD_IOP_MMHG: 11

## 2023-09-16 ASSESSMENT — CONFRONTATIONAL VISUAL FIELD TEST (CVF)
OS_FINDINGS: FULL
OD_FINDINGS: FULL

## 2023-09-21 PROBLEM — H50.10 EXOTROPIA, UNSPECIFIED: Status: ACTIVE | Noted: 2023-09-20

## 2024-01-02 ENCOUNTER — OFFICE (OUTPATIENT)
Dept: URBAN - METROPOLITAN AREA CLINIC 94 | Facility: CLINIC | Age: 54
Setting detail: OPHTHALMOLOGY
End: 2024-01-02
Payer: MEDICAID

## 2024-01-02 DIAGNOSIS — E11.3311: ICD-10-CM

## 2024-01-02 DIAGNOSIS — E11.3313: ICD-10-CM

## 2024-01-02 PROBLEM — E11.3312 DM TYPE 2; RIGHT MOD WITH ME, LEFT MOD WITH ME; 
BOTH EYES: Status: ACTIVE | Noted: 2024-01-02

## 2024-01-02 PROBLEM — H02.531: Status: ACTIVE | Noted: 2024-01-02

## 2024-01-02 PROBLEM — H02.534: Status: ACTIVE | Noted: 2024-01-02

## 2024-01-02 PROCEDURE — 92235 FLUORESCEIN ANGRPH MLTIFRAME: CPT | Performed by: OPHTHALMOLOGY

## 2024-01-02 PROCEDURE — 92134 CPTRZ OPH DX IMG PST SGM RTA: CPT | Performed by: OPHTHALMOLOGY

## 2024-01-02 PROCEDURE — 67210 TREATMENT OF RETINAL LESION: CPT | Mod: RT | Performed by: OPHTHALMOLOGY

## 2024-01-02 ASSESSMENT — REFRACTION_AUTOREFRACTION
OS_AXIS: 022
OS_CYLINDER: -0.25
OS_SPHERE: +0.75
OD_SPHERE: +0.75
OD_AXIS: 153
OD_CYLINDER: -0.25

## 2024-01-02 ASSESSMENT — LID EXAM ASSESSMENTS
OD_BLEPHARITIS: +1
OS_BLEPHARITIS: +1

## 2024-01-02 ASSESSMENT — CONFRONTATIONAL VISUAL FIELD TEST (CVF)
OD_FINDINGS: FULL
OS_FINDINGS: FULL

## 2024-01-02 ASSESSMENT — SPHEQUIV_DERIVED
OD_SPHEQUIV: 0.625
OS_SPHEQUIV: 0.625

## 2024-03-29 PROBLEM — H50.10 EXOTROPIA, UNSPECIFIED: Status: ACTIVE | Noted: 2024-03-20

## 2024-04-03 ENCOUNTER — OFFICE (OUTPATIENT)
Dept: URBAN - METROPOLITAN AREA CLINIC 94 | Facility: CLINIC | Age: 54
Setting detail: OPHTHALMOLOGY
End: 2024-04-03
Payer: MEDICAID

## 2024-04-03 DIAGNOSIS — E11.3313: ICD-10-CM

## 2024-04-03 DIAGNOSIS — E11.3312: ICD-10-CM

## 2024-04-03 PROCEDURE — 67210 TREATMENT OF RETINAL LESION: CPT | Mod: LT | Performed by: OPHTHALMOLOGY

## 2024-04-03 PROCEDURE — 92134 CPTRZ OPH DX IMG PST SGM RTA: CPT | Performed by: OPHTHALMOLOGY

## 2024-04-03 ASSESSMENT — LID EXAM ASSESSMENTS
OS_BLEPHARITIS: +1
OD_BLEPHARITIS: +1

## 2024-04-16 ENCOUNTER — OFFICE (OUTPATIENT)
Dept: URBAN - METROPOLITAN AREA CLINIC 94 | Facility: CLINIC | Age: 54
Setting detail: OPHTHALMOLOGY
End: 2024-04-16
Payer: MEDICAID

## 2024-04-16 DIAGNOSIS — E11.3313: ICD-10-CM

## 2024-04-16 PROCEDURE — 92134 CPTRZ OPH DX IMG PST SGM RTA: CPT | Performed by: OPHTHALMOLOGY

## 2024-04-16 PROCEDURE — 67028 INJECTION EYE DRUG: CPT | Mod: 79,50 | Performed by: OPHTHALMOLOGY

## 2024-04-16 ASSESSMENT — LID EXAM ASSESSMENTS
OD_BLEPHARITIS: +1
OS_BLEPHARITIS: +1

## 2024-04-23 ENCOUNTER — OFFICE (OUTPATIENT)
Dept: URBAN - METROPOLITAN AREA CLINIC 94 | Facility: CLINIC | Age: 54
Setting detail: OPHTHALMOLOGY
End: 2024-04-23
Payer: MEDICAID

## 2024-04-23 DIAGNOSIS — E11.3311: ICD-10-CM

## 2024-04-23 PROBLEM — H02.531: Status: ACTIVE | Noted: 2024-04-03

## 2024-04-23 PROBLEM — H02.534: Status: ACTIVE | Noted: 2024-04-03

## 2024-04-23 PROCEDURE — 67210 TREATMENT OF RETINAL LESION: CPT | Mod: 79,RT | Performed by: OPHTHALMOLOGY

## 2024-04-23 ASSESSMENT — LID EXAM ASSESSMENTS
OD_BLEPHARITIS: +1
OS_BLEPHARITIS: +1

## 2024-05-18 ENCOUNTER — OFFICE (OUTPATIENT)
Dept: URBAN - METROPOLITAN AREA CLINIC 113 | Facility: CLINIC | Age: 54
Setting detail: OPHTHALMOLOGY
End: 2024-05-18
Payer: MEDICAID

## 2024-05-18 DIAGNOSIS — H40.013: ICD-10-CM

## 2024-05-18 PROCEDURE — 92133 CPTRZD OPH DX IMG PST SGM ON: CPT | Performed by: OPHTHALMOLOGY

## 2024-05-18 PROCEDURE — 92014 COMPRE OPH EXAM EST PT 1/>: CPT | Performed by: OPHTHALMOLOGY

## 2024-05-18 ASSESSMENT — CONFRONTATIONAL VISUAL FIELD TEST (CVF)
OS_FINDINGS: FULL
OD_FINDINGS: FULL

## 2024-05-18 ASSESSMENT — LID EXAM ASSESSMENTS
OS_BLEPHARITIS: +1
OD_BLEPHARITIS: +1

## 2024-06-04 ENCOUNTER — OFFICE (OUTPATIENT)
Dept: URBAN - METROPOLITAN AREA CLINIC 94 | Facility: CLINIC | Age: 54
Setting detail: OPHTHALMOLOGY
End: 2024-06-04
Payer: MEDICAID

## 2024-06-04 DIAGNOSIS — E11.3313: ICD-10-CM

## 2024-06-04 PROCEDURE — 67028 INJECTION EYE DRUG: CPT | Mod: 58,50 | Performed by: OPHTHALMOLOGY

## 2024-06-04 PROCEDURE — 99024 POSTOP FOLLOW-UP VISIT: CPT | Performed by: OPHTHALMOLOGY

## 2024-06-04 PROCEDURE — 92134 CPTRZ OPH DX IMG PST SGM RTA: CPT | Performed by: OPHTHALMOLOGY

## 2024-10-01 ENCOUNTER — OFFICE (OUTPATIENT)
Dept: URBAN - METROPOLITAN AREA CLINIC 94 | Facility: CLINIC | Age: 54
Setting detail: OPHTHALMOLOGY
End: 2024-10-01
Payer: MEDICAID

## 2024-10-01 DIAGNOSIS — E11.3313: ICD-10-CM

## 2024-10-01 DIAGNOSIS — E11.3312: ICD-10-CM

## 2024-10-01 PROCEDURE — 92134 CPTRZ OPH DX IMG PST SGM RTA: CPT | Performed by: OPHTHALMOLOGY

## 2024-10-01 PROCEDURE — 67210 TREATMENT OF RETINAL LESION: CPT | Mod: LT | Performed by: OPHTHALMOLOGY

## 2024-10-01 PROCEDURE — 92012 INTRM OPH EXAM EST PATIENT: CPT | Mod: 57 | Performed by: OPHTHALMOLOGY

## 2024-10-01 ASSESSMENT — LID EXAM ASSESSMENTS
OD_BLEPHARITIS: +1
OS_BLEPHARITIS: +1

## 2024-10-01 ASSESSMENT — TONOMETRY
OS_IOP_MMHG: 15
OD_IOP_MMHG: 19

## 2024-10-01 ASSESSMENT — REFRACTION_AUTOREFRACTION
OS_AXIS: 026
OD_SPHERE: +1.00
OS_SPHERE: +1.00
OS_CYLINDER: -0.25
OD_AXIS: 059
OD_CYLINDER: -0.25

## 2024-10-01 ASSESSMENT — KERATOMETRY
OS_K1POWER_DIOPTERS: 41.25
OD_K2POWER_DIOPTERS: 42.00
METHOD_AUTO_MANUAL: AUTO
OD_K1POWER_DIOPTERS: 41.75
OS_AXISANGLE_DEGREES: 104
OS_K2POWER_DIOPTERS: 42.00
OD_AXISANGLE_DEGREES: 082

## 2024-10-01 ASSESSMENT — VISUAL ACUITY
OD_BCVA: 20/30+1
OS_BCVA: 20/40+1

## 2024-10-22 ENCOUNTER — OFFICE (OUTPATIENT)
Dept: URBAN - METROPOLITAN AREA CLINIC 94 | Facility: CLINIC | Age: 54
Setting detail: OPHTHALMOLOGY
End: 2024-10-22
Payer: MEDICAID

## 2024-10-22 DIAGNOSIS — E11.3311: ICD-10-CM

## 2024-10-22 PROBLEM — H02.534: Status: ACTIVE | Noted: 2024-10-22

## 2024-10-22 PROBLEM — H02.531: Status: ACTIVE | Noted: 2024-10-22

## 2024-10-22 PROCEDURE — 67210 TREATMENT OF RETINAL LESION: CPT | Mod: 79,RT | Performed by: OPHTHALMOLOGY

## 2024-10-22 ASSESSMENT — KERATOMETRY
OD_K2POWER_DIOPTERS: 42.00
OD_K1POWER_DIOPTERS: 41.75
OS_K1POWER_DIOPTERS: 41.25
METHOD_AUTO_MANUAL: AUTO
OS_K2POWER_DIOPTERS: 42.00
OD_AXISANGLE_DEGREES: 082
OS_AXISANGLE_DEGREES: 104

## 2024-10-22 ASSESSMENT — REFRACTION_AUTOREFRACTION
OS_CYLINDER: -0.25
OS_AXIS: 026
OD_CYLINDER: -0.25
OD_SPHERE: +1.00
OD_AXIS: 059
OS_SPHERE: +1.00

## 2024-10-22 ASSESSMENT — LID EXAM ASSESSMENTS
OS_BLEPHARITIS: +1
OD_BLEPHARITIS: +1

## 2024-10-22 ASSESSMENT — VISUAL ACUITY
OD_BCVA: 20/25
OS_BCVA: 20/30

## 2024-10-22 ASSESSMENT — TONOMETRY
OD_IOP_MMHG: 16
OS_IOP_MMHG: 16

## 2024-11-16 ENCOUNTER — OFFICE (OUTPATIENT)
Dept: URBAN - METROPOLITAN AREA CLINIC 113 | Facility: CLINIC | Age: 54
Setting detail: OPHTHALMOLOGY
End: 2024-11-16
Payer: MEDICAID

## 2024-11-16 DIAGNOSIS — H40.013: ICD-10-CM

## 2024-11-16 DIAGNOSIS — H25.13: ICD-10-CM

## 2024-11-16 DIAGNOSIS — H01.004: ICD-10-CM

## 2024-11-16 DIAGNOSIS — H01.001: ICD-10-CM

## 2024-11-16 PROCEDURE — 92020 GONIOSCOPY: CPT | Performed by: OPHTHALMOLOGY

## 2024-11-16 PROCEDURE — 92083 EXTENDED VISUAL FIELD XM: CPT | Performed by: OPHTHALMOLOGY

## 2024-11-16 PROCEDURE — 92250 FUNDUS PHOTOGRAPHY W/I&R: CPT | Performed by: OPHTHALMOLOGY

## 2024-11-16 PROCEDURE — 99213 OFFICE O/P EST LOW 20 MIN: CPT | Mod: 24 | Performed by: OPHTHALMOLOGY

## 2024-11-16 ASSESSMENT — TONOMETRY
OS_IOP_MMHG: 15
OD_IOP_MMHG: 15

## 2024-11-16 ASSESSMENT — KERATOMETRY
OS_AXISANGLE_DEGREES: 113
OD_K1POWER_DIOPTERS: 41.50
OS_K2POWER_DIOPTERS: 41.50
OS_K1POWER_DIOPTERS: 41.25
METHOD_AUTO_MANUAL: AUTO
OD_AXISANGLE_DEGREES: 090
OD_K2POWER_DIOPTERS: 41.75

## 2024-11-16 ASSESSMENT — CONFRONTATIONAL VISUAL FIELD TEST (CVF)
OD_FINDINGS: FULL
OS_FINDINGS: FULL

## 2024-11-16 ASSESSMENT — REFRACTION_AUTOREFRACTION
OD_CYLINDER: -0.25
OS_SPHERE: +1.00
OD_AXIS: 091
OD_SPHERE: +0.75
OS_AXIS: 068
OS_CYLINDER: -0.25

## 2024-11-16 ASSESSMENT — LID EXAM ASSESSMENTS
OD_BLEPHARITIS: +1
OS_BLEPHARITIS: +1

## 2024-11-16 ASSESSMENT — VISUAL ACUITY
OD_BCVA: 20/20
OS_BCVA: 20/25

## 2025-02-12 PROBLEM — H50.10 EXOTROPIA, UNSPECIFIED: Status: ACTIVE | Noted: 2025-02-12

## 2025-05-06 ENCOUNTER — OFFICE (OUTPATIENT)
Dept: URBAN - METROPOLITAN AREA CLINIC 94 | Facility: CLINIC | Age: 55
Setting detail: OPHTHALMOLOGY
End: 2025-05-06
Payer: MEDICAID

## 2025-05-06 DIAGNOSIS — E11.3311: ICD-10-CM

## 2025-05-06 PROCEDURE — 92134 CPTRZ OPH DX IMG PST SGM RTA: CPT | Performed by: OPHTHALMOLOGY

## 2025-05-06 PROCEDURE — 92235 FLUORESCEIN ANGRPH MLTIFRAME: CPT | Performed by: OPHTHALMOLOGY

## 2025-05-06 PROCEDURE — 67210 TREATMENT OF RETINAL LESION: CPT | Mod: RT | Performed by: OPHTHALMOLOGY

## 2025-05-06 ASSESSMENT — TONOMETRY
OD_IOP_MMHG: 17
OS_IOP_MMHG: 13

## 2025-05-06 ASSESSMENT — KERATOMETRY
OD_K2POWER_DIOPTERS: 41.75
OD_AXISANGLE_DEGREES: 090
OS_AXISANGLE_DEGREES: 113
OS_K2POWER_DIOPTERS: 41.50
OD_K1POWER_DIOPTERS: 41.50
METHOD_AUTO_MANUAL: AUTO
OS_K1POWER_DIOPTERS: 41.25

## 2025-05-06 ASSESSMENT — REFRACTION_AUTOREFRACTION
OS_AXIS: 068
OS_CYLINDER: -0.25
OS_SPHERE: +1.00
OD_SPHERE: +0.75
OD_CYLINDER: -0.25
OD_AXIS: 091

## 2025-05-06 ASSESSMENT — CONFRONTATIONAL VISUAL FIELD TEST (CVF)
OD_FINDINGS: FULL
OS_FINDINGS: FULL

## 2025-05-06 ASSESSMENT — LID EXAM ASSESSMENTS
OD_BLEPHARITIS: +1
OS_BLEPHARITIS: +1

## 2025-05-06 ASSESSMENT — VISUAL ACUITY
OS_BCVA: 20/25-1
OD_BCVA: 20/25-1

## 2025-05-17 ENCOUNTER — OFFICE (OUTPATIENT)
Dept: URBAN - METROPOLITAN AREA CLINIC 113 | Facility: CLINIC | Age: 55
Setting detail: OPHTHALMOLOGY
End: 2025-05-17
Payer: MEDICAID

## 2025-05-17 DIAGNOSIS — H25.13: ICD-10-CM

## 2025-05-17 DIAGNOSIS — H40.013: ICD-10-CM

## 2025-05-17 PROCEDURE — 92012 INTRM OPH EXAM EST PATIENT: CPT | Performed by: OPHTHALMOLOGY

## 2025-05-17 PROCEDURE — 92133 CPTRZD OPH DX IMG PST SGM ON: CPT | Performed by: OPHTHALMOLOGY

## 2025-05-17 ASSESSMENT — CONFRONTATIONAL VISUAL FIELD TEST (CVF)
OS_FINDINGS: FULL
OD_FINDINGS: FULL

## 2025-05-17 ASSESSMENT — VISUAL ACUITY
OD_BCVA: 20/25
OS_BCVA: 20/25+1

## 2025-05-17 ASSESSMENT — KERATOMETRY
OS_AXISANGLE_DEGREES: 111
OS_K1POWER_DIOPTERS: 41.25
METHOD_AUTO_MANUAL: AUTO
OS_K2POWER_DIOPTERS: 41.75
OD_AXISANGLE_DEGREES: 106
OD_K1POWER_DIOPTERS: 41.50
OD_K2POWER_DIOPTERS: 41.75

## 2025-05-17 ASSESSMENT — REFRACTION_AUTOREFRACTION
OS_CYLINDER: -0.25
OS_AXIS: 080
OD_AXIS: 081
OD_SPHERE: +1.00
OS_SPHERE: +1.00
OD_CYLINDER: -0.50

## 2025-05-17 ASSESSMENT — TONOMETRY
OS_IOP_MMHG: 15
OD_IOP_MMHG: 15

## 2025-05-17 ASSESSMENT — LID EXAM ASSESSMENTS
OD_BLEPHARITIS: +1
OS_BLEPHARITIS: +1

## 2025-05-27 ENCOUNTER — OFFICE (OUTPATIENT)
Dept: URBAN - METROPOLITAN AREA CLINIC 94 | Facility: CLINIC | Age: 55
Setting detail: OPHTHALMOLOGY
End: 2025-05-27
Payer: MEDICAID

## 2025-05-27 DIAGNOSIS — E11.3312: ICD-10-CM

## 2025-05-27 PROBLEM — H02.531: Status: ACTIVE | Noted: 2025-05-06

## 2025-05-27 PROBLEM — H02.534: Status: ACTIVE | Noted: 2025-05-06

## 2025-05-27 PROCEDURE — 67210 TREATMENT OF RETINAL LESION: CPT | Mod: 79,LT | Performed by: OPHTHALMOLOGY

## 2025-05-27 ASSESSMENT — KERATOMETRY
OS_AXISANGLE_DEGREES: 111
OS_K1POWER_DIOPTERS: 41.25
OD_K1POWER_DIOPTERS: 41.50
OS_K2POWER_DIOPTERS: 41.75
OD_AXISANGLE_DEGREES: 106
METHOD_AUTO_MANUAL: AUTO
OD_K2POWER_DIOPTERS: 41.75

## 2025-05-27 ASSESSMENT — LID EXAM ASSESSMENTS
OS_BLEPHARITIS: +1
OD_BLEPHARITIS: +1

## 2025-05-27 ASSESSMENT — REFRACTION_AUTOREFRACTION
OS_AXIS: 080
OD_SPHERE: +1.00
OD_CYLINDER: -0.50
OS_CYLINDER: -0.25
OD_AXIS: 081
OS_SPHERE: +1.00

## 2025-05-27 ASSESSMENT — VISUAL ACUITY
OD_BCVA: 20/25
OS_BCVA: 20/25-1

## 2025-05-27 ASSESSMENT — TONOMETRY
OD_IOP_MMHG: 15
OS_IOP_MMHG: 14

## 2025-05-27 ASSESSMENT — CONFRONTATIONAL VISUAL FIELD TEST (CVF)
OD_FINDINGS: FULL
OS_FINDINGS: FULL